# Patient Record
Sex: MALE | Race: OTHER | HISPANIC OR LATINO | Employment: FULL TIME | URBAN - METROPOLITAN AREA
[De-identification: names, ages, dates, MRNs, and addresses within clinical notes are randomized per-mention and may not be internally consistent; named-entity substitution may affect disease eponyms.]

---

## 2022-01-31 ENCOUNTER — OCCUPATIONAL HEALTH (OUTPATIENT)
Dept: URGENT CARE | Facility: CLINIC | Age: 32
End: 2022-01-31

## 2022-01-31 DIAGNOSIS — Z02.83 ENCOUNTER FOR DRUG SCREENING: Primary | ICD-10-CM

## 2022-01-31 LAB
CTP QC/QA: YES
POC 10 PANEL DRUG SCREEN: NEGATIVE

## 2022-01-31 PROCEDURE — 80305 POCT RAPID DRUG SCREEN 10 PANEL: ICD-10-PCS | Mod: S$GLB,,, | Performed by: NURSE PRACTITIONER

## 2022-01-31 PROCEDURE — 80305 DRUG TEST PRSMV DIR OPT OBS: CPT | Mod: S$GLB,,, | Performed by: NURSE PRACTITIONER

## 2024-03-10 ENCOUNTER — HOSPITAL ENCOUNTER (EMERGENCY)
Facility: HOSPITAL | Age: 34
Discharge: HOME | End: 2024-03-10
Attending: EMERGENCY MEDICINE
Payer: MEDICAID

## 2024-03-10 VITALS
WEIGHT: 165 LBS | DIASTOLIC BLOOD PRESSURE: 80 MMHG | SYSTOLIC BLOOD PRESSURE: 114 MMHG | TEMPERATURE: 98 F | HEIGHT: 67 IN | BODY MASS INDEX: 25.9 KG/M2 | OXYGEN SATURATION: 100 % | RESPIRATION RATE: 16 BRPM | HEART RATE: 58 BPM

## 2024-03-10 DIAGNOSIS — K62.5 RECTAL BLEEDING: ICD-10-CM

## 2024-03-10 DIAGNOSIS — F19.10 DRUG ABUSE: ICD-10-CM

## 2024-03-10 DIAGNOSIS — K62.89 RECTAL PAIN: Primary | ICD-10-CM

## 2024-03-10 LAB
ALBUMIN SERPL BCP-MCNC: 4.5 G/DL (ref 3.5–5.2)
ALP SERPL-CCNC: 55 U/L (ref 55–135)
ALT SERPL W/O P-5'-P-CCNC: 14 U/L (ref 10–44)
AMPHET+METHAMPHET UR QL: NEGATIVE
ANION GAP SERPL CALC-SCNC: 11 MMOL/L (ref 8–16)
AST SERPL-CCNC: 24 U/L (ref 10–40)
BARBITURATES UR QL SCN>200 NG/ML: NEGATIVE
BENZODIAZ UR QL SCN>200 NG/ML: NEGATIVE
BILIRUB SERPL-MCNC: 0.4 MG/DL (ref 0.1–1)
BILIRUB UR QL STRIP: NEGATIVE
BUN SERPL-MCNC: 9 MG/DL (ref 6–20)
BZE UR QL SCN: NEGATIVE
CALCIUM SERPL-MCNC: 9.3 MG/DL (ref 8.7–10.5)
CANNABINOIDS UR QL SCN: ABNORMAL
CHLORIDE SERPL-SCNC: 102 MMOL/L (ref 95–110)
CLARITY UR: CLEAR
CO2 SERPL-SCNC: 24 MMOL/L (ref 23–29)
COLOR UR: COLORLESS
CREAT SERPL-MCNC: 1 MG/DL (ref 0.5–1.4)
CREAT UR-MCNC: 24.4 MG/DL (ref 23–375)
ERYTHROCYTE [DISTWIDTH] IN BLOOD BY AUTOMATED COUNT: 12 % (ref 11.5–14.5)
EST. GFR  (NO RACE VARIABLE): >60 ML/MIN/1.73 M^2
GLUCOSE SERPL-MCNC: 120 MG/DL (ref 70–110)
GLUCOSE UR QL STRIP: NEGATIVE
HCT VFR BLD AUTO: 35.6 % (ref 40–54)
HGB BLD-MCNC: 13.4 G/DL (ref 14–18)
HGB UR QL STRIP: NEGATIVE
KETONES UR QL STRIP: NEGATIVE
LEUKOCYTE ESTERASE UR QL STRIP: NEGATIVE
MCH RBC QN AUTO: 30.4 PG (ref 27–31)
MCHC RBC AUTO-ENTMCNC: 37.5 G/DL (ref 32–36)
MCV RBC AUTO: 81 FL (ref 82–98)
METHADONE UR QL SCN>300 NG/ML: NEGATIVE
NITRITE UR QL STRIP: NEGATIVE
OPIATES UR QL SCN: NEGATIVE
PCP UR QL SCN>25 NG/ML: NEGATIVE
PH UR STRIP: 7 [PH] (ref 5–8)
PLATELET # BLD AUTO: 184 K/UL (ref 150–450)
PMV BLD AUTO: 10 FL (ref 9.2–12.9)
POTASSIUM SERPL-SCNC: 3.6 MMOL/L (ref 3.5–5.1)
PROT SERPL-MCNC: 7.6 G/DL (ref 6–8.4)
PROT UR QL STRIP: NEGATIVE
RBC # BLD AUTO: 4.41 M/UL (ref 4.6–6.2)
SODIUM SERPL-SCNC: 137 MMOL/L (ref 136–145)
SP GR UR STRIP: 1.02 (ref 1–1.03)
TOXICOLOGY INFORMATION: ABNORMAL
URN SPEC COLLECT METH UR: ABNORMAL
UROBILINOGEN UR STRIP-ACNC: NEGATIVE EU/DL
WBC # BLD AUTO: 5.97 K/UL (ref 3.9–12.7)

## 2024-03-10 PROCEDURE — 81003 URINALYSIS AUTO W/O SCOPE: CPT | Mod: 59 | Performed by: EMERGENCY MEDICINE

## 2024-03-10 PROCEDURE — 25000003 PHARM REV CODE 250: Performed by: EMERGENCY MEDICINE

## 2024-03-10 PROCEDURE — 93005 ELECTROCARDIOGRAM TRACING: CPT

## 2024-03-10 PROCEDURE — 85027 COMPLETE CBC AUTOMATED: CPT | Performed by: EMERGENCY MEDICINE

## 2024-03-10 PROCEDURE — 25500020 PHARM REV CODE 255: Performed by: EMERGENCY MEDICINE

## 2024-03-10 PROCEDURE — 93010 ELECTROCARDIOGRAM REPORT: CPT | Mod: ,,, | Performed by: INTERNAL MEDICINE

## 2024-03-10 PROCEDURE — 96360 HYDRATION IV INFUSION INIT: CPT

## 2024-03-10 PROCEDURE — 80307 DRUG TEST PRSMV CHEM ANLYZR: CPT | Performed by: EMERGENCY MEDICINE

## 2024-03-10 PROCEDURE — 80053 COMPREHEN METABOLIC PANEL: CPT | Performed by: EMERGENCY MEDICINE

## 2024-03-10 PROCEDURE — 99285 EMERGENCY DEPT VISIT HI MDM: CPT | Mod: 25

## 2024-03-10 RX ORDER — BICTEGRAVIR SODIUM, EMTRICITABINE, AND TENOFOVIR ALAFENAMIDE FUMARATE 30; 120; 15 MG/1; MG/1; MG/1
TABLET ORAL
COMMUNITY

## 2024-03-10 RX ORDER — DOCUSATE SODIUM 100 MG/1
100 CAPSULE, LIQUID FILLED ORAL 2 TIMES DAILY
Qty: 30 CAPSULE | Refills: 0 | Status: SHIPPED | OUTPATIENT
Start: 2024-03-10 | End: 2024-03-20

## 2024-03-10 RX ADMIN — SODIUM CHLORIDE 1000 ML: 9 INJECTION, SOLUTION INTRAVENOUS at 10:03

## 2024-03-10 RX ADMIN — IOHEXOL 75 ML: 350 INJECTION, SOLUTION INTRAVENOUS at 10:03

## 2024-03-10 NOTE — ED PROVIDER NOTES
"Emergency Department Encounter  Provider Note    Joe Brenner  [id number]  3/10/2024    Evaluation:    History Acquisition:     Chief Complaint   Patient presents with    Rectal Bleeding     Pt states he has been up for several days doing meth and having anal sex for 36 hours, pt reports rectal bleeding after anal sex, and back pain, + nausea and HA        History of Present Illness:  Joe Brenner is a 34 y.o. male who has a past medical history of Human immunodeficiency virus (HIV) disease.    The patient presents to the ED due to multiple concerns.  Patient states he has been having a lot of sex every day for the last month. He states he has been having anal sex for the last 36 hours while doing a lot of meth.   He states he "feels different" today.  He has been having rectal bleeding as well as pain.   He is tearful and emotionally labile.     He is a very difficult historian despite using the .      #[id number] used for professional medical interpretation.       Additional historians utilized:  none    Prior medical records were reviewed:   ED visit 08/2023 for abdominal pain, rectal bleeding. Diagnosed with constipation.   Dentist visit 05/2023  Colorectal surgery visit 04/2022 for f/u anal fistula    The patient's list of active medical history, family/social history, medications, and allergies as documented has been reviewed.     Past Medical History:   Diagnosis Date    Human immunodeficiency virus (HIV) disease      History reviewed. No pertinent surgical history.  History reviewed. No pertinent family history.  Social History     Socioeconomic History    Marital status: Single       Medications:  Medication List with Changes/Refills   New Medications    DOCUSATE SODIUM (COLACE) 100 MG CAPSULE    Take 1 capsule (100 mg total) by mouth 2 (two) times daily. for 10 days    HYDROCORTISONE-PRAMOXINE (PROCTOFOAM-HS) RECTAL FOAM    Place 1 applicator rectally 2 (two) times daily. "   Current Medications    AMUPJXFXC-UBWOSOPC-TVFLCPJ ALA (BIKTARVY) -15 MG TAB (LESS THAN 25 KG)    Take by mouth.       Allergies:  Review of patient's allergies indicates:  No Known Allergies    Review of Systems   Gastrointestinal:  Positive for abdominal pain and anal bleeding.         Physical Exam:     Initial Vitals   BP Pulse Resp Temp SpO2   03/10/24 0952 03/10/24 0951 03/10/24 0951 03/10/24 0951 03/10/24 0952   128/71 (!) 114 18 98 °F (36.7 °C) 100 %      MAP       --                Physical Exam    Nursing note and vitals reviewed.  Constitutional: He appears well-developed and well-nourished. He is not diaphoretic. No distress.   HENT:   Head: Normocephalic and atraumatic.   Mouth/Throat: Oropharynx is clear and moist.   Eyes: EOM are normal. Pupils are equal, round, and reactive to light.   Neck: No tracheal deviation present.   Cardiovascular:  Normal rate, regular rhythm, normal heart sounds and intact distal pulses.           Pulmonary/Chest: Breath sounds normal. No stridor. No respiratory distress.   Abdominal: Abdomen is soft. He exhibits no distension and no mass. There is no abdominal tenderness.   Genitourinary:    Rectum normal.      Genitourinary Comments: No rectal bleeding, fissure, or hemorrhoids appreciated.      Musculoskeletal:         General: No edema. Normal range of motion.     Neurological: He is alert and oriented to person, place, and time. No cranial nerve deficit or sensory deficit.   Skin: Skin is warm and dry. Capillary refill takes less than 2 seconds. No rash noted.   Psychiatric: His behavior is normal. Thought content normal. His mood appears anxious. His affect is labile. He exhibits a depressed mood.   Tearful, anxious.          Differential Diagnoses:   Based on available information and initial assessment, Differential Diagnosis includes, but is not limited to:  AAA, aortic dissection, mesenteric ischemia, perforated viscous, MI/ACS, SBO/volvulus,  incarcerated/strangulated hernia, intussusception, ileus, appendicitis, cholecystitis, cholangitis, diverticulitis, esophagitis, hepatitis, nephrolithiasis, pancreatitis, gastroenteritis, colitis, IBD/IBS, biliary colic, GERD, PUD, constipation, UTI/pyelonephritis,  disorder.      ED Management:   Procedures    Orders Placed This Encounter    CT Abdomen Pelvis With IV Contrast NO Oral Contrast    CBC Without Differential    Comprehensive metabolic panel    Urinalysis, Reflex to Urine Culture Urine, Clean Catch    Drug screen panel, in-house    EKG 12-lead    Insert peripheral IV    sodium chloride 0.9% bolus 1,000 mL 1,000 mL    iohexoL (OMNIPAQUE 350) injection 75 mL    docusate sodium (COLACE) 100 MG capsule    hydrocortisone-pramoxine (PROCTOFOAM-HS) rectal foam          EKG:   EKG interpretation by ED attending physician:  NSR, rate 73, no ST changes, no ischemia, normal intervals.  No prior for comparison.       Labs:     Labs Reviewed   CBC WITHOUT DIFFERENTIAL - Abnormal; Notable for the following components:       Result Value    RBC 4.41 (*)     Hemoglobin 13.4 (*)     Hematocrit 35.6 (*)     MCV 81 (*)     MCHC 37.5 (*)     All other components within normal limits   COMPREHENSIVE METABOLIC PANEL - Abnormal; Notable for the following components:    Glucose 120 (*)     All other components within normal limits   URINALYSIS, REFLEX TO URINE CULTURE - Abnormal; Notable for the following components:    Color, UA Colorless (*)     All other components within normal limits    Narrative:     Specimen Source->Urine   DRUG SCREEN PANEL, URINE EMERGENCY - Abnormal; Notable for the following components:    THC Presumptive Positive (*)     All other components within normal limits    Narrative:     Specimen Source->Urine     Independent review of the labs ordered include:   See ED course    Imaging:     Imaging Results              CT Abdomen Pelvis With IV Contrast NO Oral Contrast (Final result)  Result time  03/10/24 11:36:31      Final result by Starr Guillaume MD (03/10/24 11:36:31)                   Impression:      No definite abnormality to explain patient's symptoms of rectal bleeding.    Incidentally seen is a short segment of enteric enteric intussusception left upper abdominal quadrant with no adjacent inflammatory change or signs of obstruction.      Electronically signed by: Starr Guillaume MD  Date:    03/10/2024  Time:    11:36               Narrative:    EXAMINATION:  CT ABDOMEN PELVIS WITH IV CONTRAST    CLINICAL HISTORY:  Abdominal abscess/infection suspected;Abdominal pain, acute, nonlocalized;    TECHNIQUE:  Low dose axial images, sagittal and coronal reformations were obtained from the lung bases to the pubic symphysis following the IV administration of 75 mL of Omnipaque 350 .  Oral contrast was not given. Axial and coronal images reformatted.    COMPARISON:  None    FINDINGS:  The lung bases are clear.  No pleural effusion.  No pericardial effusion.    The liver appears normal.  The gallbladder is present, no radiopaque stones seen within.    The distal esophagus and stomach appear normal.    The pancreas, spleen, bilateral adrenal glands appear normal.  Subcentimeter hypoattenuating lesion at the midpole of the right kidney and upper pole of the left kidney, too small to characterize.  No hydronephrosis, no hydroureter.  The bladder prostate appear within normal limits.    Mild stool retention, no inflammatory changes of the bowel seen, no bowel dilation.  Subtle anterior enteric-enteric is intussusception, left upper abdominal quadrant (2:64), no associated mass seen, no proximal bowel dilation, no adjacent inflammatory change.  The appendix is not definitely identified.  No perirectal/perianal abscess.  The bilateral ischial rectal fossa appear normal.    No intra-abdominal inflammatory change, no free air, no free fluid.    The abdominal wall is intact, the inguinal regions appear  normal.    The osseous structures demonstrate no osseous lesions.                                         Medications Given:     Medications   sodium chloride 0.9% bolus 1,000 mL 1,000 mL (0 mLs Intravenous Stopped 3/10/24 1138)   iohexoL (OMNIPAQUE 350) injection 75 mL (75 mLs Intravenous Given 3/10/24 1054)        Medical Decision Making:    Additional Consideration:   Additional testing considered during clinical course: none    Social determinants of health considered during development of treatment plan include: poor access to care, language barrier    Current co-morbidities considered which impacted clinical decision making: HCV, HIV on antiretroviral therapy, anal fistula     Case discussed with additional provider: none    ED Course as of 03/10/24 1243   Sun Mar 10, 2024   1006 SpO2: 100 % [SS]   1006 Resp: 18 [SS]   1006 Pulse(!): 114 [SS]   1006 Temp Source: Oral [SS]   1006 Temp: 98 °F (36.7 °C) [SS]   1006 BP: 128/71  Mildly tachycardic, normal pulse ox [SS]   1116 Comprehensive metabolic panel(!)  Unremarkable  [SS]   1148 CT Abdomen Pelvis With IV Contrast NO Oral Contrast  CT A/P independently interpreted: no high-grade bowel obstruction or free air. Incidental intussusception.  Agree with radiologist interpretation.    [SS]   1149 Delay due to lab downtime. Results manually faxed to ED. [SS]   1149 UA: unremarkable, inconsistent with UTI [SS]   1236 CBC Without Differential(!)  Unremarkable  [SS]   1236 Drug screen panel, in-house(!)  UDS + marijuana [SS]      ED Course User Index  [SS] Jose Luis So MD            Medical Decision Making  35 yo M with HCV, HIV on antiretroviral therapy, anal fistula presents to ED with multiple concerns. Patient endorses repetitive anal sex and now endorses rectal bleeding and pain.   Will obtain labs, CT A/P, and reassess    Labs, UA, CT A/P unremarkable.  Will DC with supportive care. Recommend no anal intercourse or FB use. PCP f/u recommended.    Problems  Addressed:  Drug abuse: acute illness or injury  Rectal bleeding: acute illness or injury  Rectal pain: acute illness or injury    Amount and/or Complexity of Data Reviewed  External Data Reviewed: notes.  Labs: ordered. Decision-making details documented in ED Course.  Radiology: ordered and independent interpretation performed. Decision-making details documented in ED Course.  ECG/medicine tests: ordered and independent interpretation performed. Decision-making details documented in ED Course.    Risk  OTC drugs.  Prescription drug management.  Diagnosis or treatment significantly limited by social determinants of health.        Clinical Impression:       ICD-10-CM ICD-9-CM   1. Rectal pain  K62.89 569.42   2. Drug abuse  F19.10 305.90   3. Rectal bleeding  K62.5 569.3       Discharge Medications:  Current Discharge Medication List        START taking these medications    Details   docusate sodium (COLACE) 100 MG capsule Take 1 capsule (100 mg total) by mouth 2 (two) times daily. for 10 days  Qty: 30 capsule, Refills: 0      hydrocortisone-pramoxine (PROCTOFOAM-HS) rectal foam Place 1 applicator rectally 2 (two) times daily.  Qty: 10 g, Refills: 0               Follow-up Information       Follow up With Specialties Details Why Contact Info    Your Primary Care Provider  Schedule an appointment as soon as possible for a visit   as soon as able                  On re-evaluation, the patient's status has improved.  PCP follow-up as soon as possible was recommended.    After taking into careful account the patient's history, physical exam findings, as well as empirical and objective data obtained throughout ED workup, I feel no emergent medical condition has been identified. No further evaluation or admission was felt to be required, and the patient is stable for discharge from the ED. The patient and any additional family present were updated with test results, overall clinical impression, and recommended further  plan of care, including discharge instructions as provided and outpatient follow-up for continued evaluation and management as needed. All questions were answered. The patient expressed understanding and agreed with current plan for discharge and follow-up plan of care. Strict ED return precautions were provided, including return/worsening of current symptoms, new symptoms, or any other concerns.       Jose Luis So MD  03/10/24 3267

## 2024-03-10 NOTE — DISCHARGE INSTRUCTIONS
Thank you for choosing [facility]!     Our goal in the Emergency Department is to always provide outstanding medical care. You may receive a survey by mail or e-mail in the next week regarding your experience today. We would greatly appreciate you completing and returning the survey. Your feedback provides us with a way to recognize our staff who provide very good care, and it helps us learn how to improve when your experience was below our aspiration of excellence.      It is important to remember that some problems are difficult to diagnose and may not be found during your first visit. Be sure to follow up with your primary care doctor and review any labs/imaging that was performed during your visit with them. If you do not have a primary care doctor, you may contact the one listed on your discharge paperwork, or you may also call the [facility] Appointment Desk at [phone] to schedule an appointment.     All medications may potentially have side effects and it is impossible to predict which medications may give you side effects. If you feel that you are having a negative effect of any medication you should immediately stop taking them and seek medical attention.  Do not drive or make any important decisions for 24 hours if you have received any pain medications, sedatives or mood altering drugs during your ER visit.    We appreciate you trusting us with your medical care. We will be happy to take care of you for all of your future medical needs. You may return to the ER at any time for any new/concerning symptoms, worsening condition, or failure to improve. We hope you feel better soon.     Sincerely,    Jose Luis So Jr., MD  Board-Certified Emergency Medicine Physician  [facility]

## 2024-03-12 LAB
OHS QRS DURATION: 100 MS
OHS QTC CALCULATION: 405 MS